# Patient Record
Sex: MALE | Race: WHITE | URBAN - METROPOLITAN AREA
[De-identification: names, ages, dates, MRNs, and addresses within clinical notes are randomized per-mention and may not be internally consistent; named-entity substitution may affect disease eponyms.]

---

## 2021-02-09 ENCOUNTER — TELEPHONE (OUTPATIENT)
Dept: PHYSICAL MEDICINE AND REHAB | Age: 61
End: 2021-02-09

## 2021-03-02 DIAGNOSIS — M54.2 CERVICALGIA: Primary | ICD-10-CM

## 2021-03-03 ENCOUNTER — OFFICE VISIT (OUTPATIENT)
Dept: PHYSICAL MEDICINE AND REHAB | Age: 61
End: 2021-03-03
Payer: MEDICARE

## 2021-03-03 VITALS — HEIGHT: 68 IN | WEIGHT: 205 LBS | BODY MASS INDEX: 31.07 KG/M2

## 2021-03-03 DIAGNOSIS — M54.12 CERVICAL RADICULOPATHY: ICD-10-CM

## 2021-03-03 DIAGNOSIS — G56.01 CARPAL TUNNEL SYNDROME OF RIGHT WRIST: Primary | ICD-10-CM

## 2021-03-03 PROCEDURE — 95912 NRV CNDJ TEST 11-12 STUDIES: CPT | Performed by: PHYSICAL MEDICINE & REHABILITATION

## 2021-03-03 PROCEDURE — G8417 CALC BMI ABV UP PARAM F/U: HCPCS | Performed by: PHYSICAL MEDICINE & REHABILITATION

## 2021-03-03 PROCEDURE — G8484 FLU IMMUNIZE NO ADMIN: HCPCS | Performed by: PHYSICAL MEDICINE & REHABILITATION

## 2021-03-03 PROCEDURE — G8428 CUR MEDS NOT DOCUMENT: HCPCS | Performed by: PHYSICAL MEDICINE & REHABILITATION

## 2021-03-03 PROCEDURE — 95886 MUSC TEST DONE W/N TEST COMP: CPT | Performed by: PHYSICAL MEDICINE & REHABILITATION

## 2021-03-03 PROCEDURE — 99202 OFFICE O/P NEW SF 15 MIN: CPT | Performed by: PHYSICAL MEDICINE & REHABILITATION

## 2021-03-03 NOTE — PROGRESS NOTES
[id number]  Electrodiagnostic Laboratory  *Accredited by the [address] [place] [address] with exemplary status  [address] [facility] [place]  Phone: [phone]  Fax: [phone]      Date of Examination: 03/03/21  Patient Name: Nika Amin  is a 62y.o. year old male who was seen due to complaints of   Chief Complaint   Patient presents with    Extremity Pain     right arm: pain in neck to fingers. pain 8/10 on and off. 3+mos.  Numbness     numbness in wrist to fingers. numbness comes and goes.  Extremity Weakness     no weakness     Physical Exam: MSK: There is no joint effusion, deformity, instability, swelling, erythema or warmth. AROM is full in the spine and extremities. +Spurling right, +tinel on right wrist. Neurologic:  No focal sensorimotor deficit. Reflexes 2+ and symmetric. Gait is normal.    Impression:     1. Carpal tunnel syndrome of right wrist    2. Cervical radiculopathy        Plan:   · EMG is indicated to evaluate the above diagnosis. Orders Placed This Encounter   Procedures    FL NEEDLE EMG EA EXTREMTY W/PARASPINL AREA COMPLETE    FL MOTOR &/SENS 11-12 NRV CNDJ PRECONF ELTRODE LIMB     · EMG was done today and showed right C6 radiculopathy, right carpal tunnel sydrome. The patient was educated about the diagnosis and the prognosis. · Consider cervical MRI. · Advised patient to follow up with referring provider. Thank you for allowing me to participate in the care of your patient.       Sincerely,     Venita Deal

## 2021-03-03 NOTE — PROGRESS NOTES
[address] [facility]  Electrodiagnostic Laboratory  *Accredited by the [address] [place] [address] with exemplary status  [address] [facility] [place]  Phone: [phone]  Fax: [phone]    Referring Provider: Read Gitelman, DO  Primary Care Physician: No primary care provider on file. Patient Name: Lisy Jacobs  Patient YOB: 1960  Gender: male  BMI: Body mass index is 31.17 kg/m². Height 5' 8\" (1.727 m), weight 205 lb (93 kg). 3/3/2021    Description of clinical problem:   Chief Complaint   Patient presents with    Extremity Pain     right arm: pain in neck to fingers. pain 8/10 on and off. 3+mos.  Numbness     numbness in wrist to fingers. numbness comes and goes.  Extremity Weakness     no weakness     Sensory NCS      Nerve / Sites Rec. Site Peak Lat PP Amp Segments Distance Velocity Temp. ms µV  cm m/s °C   R Median - Digit II (Antidromic)      Palm Dig II 1.93 22.5 Palm - Dig II 7 52 33.5      Wrist Dig II 3.65 22.6 Wrist - Dig II 14 49 32   R Ulnar - Digit V (Antidromic)      Wrist Dig V 2.60 5.0 Wrist - Dig V 14 69 33.3   R Radial - Anatomical snuff box (Forearm)      Forearm Wrist 2.71 17.9 Forearm - Wrist 10 48 32       Combined Sensory Index      Nerve / Sites Rec. Site Peak Lat NP Amp PP Amp Segments Dist. Peak Diff Temp. ms µV µV  cm ms °C   R Median - CSI      Median Thumb 3.85 6.5 5.8 Median - Radial 10 0.52 31.4      Radial Thumb 3.33 3.2 1.8 Median - Ulnar 14 -2.03 31.4      Median Ring 4.06 7.3 14.1 Median palm - Ulnar palm 8        Ulnar Ring 6.09 12.1 26.7          CSI     CSI  1.51*        Motor NCS      Nerve / Sites Muscle Onset Amplitude Segments Distance Velocity Temp.     ms mV  cm m/s °C   R Median - APB      Palm APB 1.41 11.8 Palm - APB   31.1      Wrist APB 4.22 7.2 Wrist - Palm 8 28* 31.5      Elbow APB 8.49 6.6 Elbow - Wrist 20 47* 31.3   R Ulnar - ADM      Wrist ADM 2.97 11.4 Wrist - ADM 8  31.9      B. Elbow ADM 6.61 9.1 B.Elbow - Wrist 18 49 32.4      A. Elbow ADM 8.75 9.1 A. Elbow - B. Elbow 10 47 32.6       F  Wave      Nerve Fmin % F    ms %   R Median - APB 30.99 100   R Ulnar - ADM 31.20 100       EMG      EMG Summary Table     Spontaneous MUAP Recruitment   Muscle Nerve Roots IA Fib PSW Fasc Amp Dur. PPP Pattern   R. Triceps brachii Radial C6-8 N 1+ 1+ None N N N Decreased   R. Biceps brachii Musculocutaneous C5-C6 N 3+ 3+ None N N N Discrete   R. Pronator teres Median C6-C7 N None None None N N N N   R. First dorsal interosseous Ulnar C8-T1 N None None None N N N N   R. Abductor pollicis brevis Median Y3-V9 N None None None N N N N   R. Cervical paraspinals (low)  - N None None None N N N N   R. Cervical paraspinals (mid)  - N 2+ 2+ None N N N N       Study Limitations:  none    Summary of Findings:   Nerve conduction studies:   · The following nerve conduction studies were abnormal:   · The combined sensory index was abnormal.   · The right median motor conduction velocity across the wrist was focally slow. · All other nerve conduction studies listed in the table above were normal in latency, amplitude and conduction velocity. Needle EMG:   · Needle EMG was performed using a concentric needle. · The following abnormalities were seen on needle EMG: positive sharp waves and fibrillation potentials with decreased motor unit recruitment was present in the right C6 myotome.  All other muscles tested, as listed in the table above demonstrated normal amplitude, duration, phases and recruitment and no active denervation signs were seen. Diagnostic Interpretation: This study was abnormal.     Electrodiagnosis: There is electrodiagnostic evidence of a median mononeuropathy.    · Location: right at the wrist.   · Nature: [  ] Axonal   [ X ] Demyelinating  [  ] Mixed axonal and demyelinating     [  ] Sensory [  ] Motor               [ X ] Mixed sensorimotor     [  ] with active denervation       [ X ] without active denervation  · Duration: Acute  · Severity: moderate  · Prognosis: Good. The prognosis for recovery of demyelinating lesions is good if the cause is alleviated. Electrodiagnosis: There is electrodiagnostic evidence of a right radiculopathy. · Location: right C6    · Nature: [ X ] Axonal   [  ] Demyelinating  [  ] Mixed axonal and demyelinating     [  ] Sensory [ X ] Motor               [  ] Mixed sensorimotor     [ X ] with active denervation       [  ] without active denervation  · Duration: Subacute  · Severity: moderate  Prognosis: Poor . The prognosis for recovery of axonal lesions is poor and dependant on collateral sprouting and reinnervation. Previous Study: yes, on left arm 15+ years ago. Follow up EMG is recommended if clinically warranted. Technologist: Marli Gibson  Physician:     Eyal Blanchard D.O., P.T. Board Certified Physical Medicine and Rehabilitation  Board Certified Electrodiagnostic Medicine      Nerve conduction studies and electromyography were performed according to our laboratory policies and procedures which can be provided upon request. All abnormal values are identified in the table. Laboratory normal values can also be provided upon request.       Cc: Radha Galeas, DO  No primary care provider on file.

## 2021-03-03 NOTE — PATIENT INSTRUCTIONS
Electrodiagnotic Laboratory  [facility] the [facility] with Ex[facility]  BERTO Scales D.O. [facility]  [address] [facility] [place]  Phone: [phone]  Fax: [phone]        Today you had an electrodiagnostic exam which included nerve conduction studies (NCS) and electromyography (EMG). This test evaluated the electrical activity of your nerves and muscles to help determine if you have a nerve or muscle disease. This test can help determine the location and type of a nerve or muscle problem. This will help your referring doctor diagnose your condition and determine the appropriate next step in your treatment plan. After your test:    1. There are no long lasting side effects of the test.     2. You may resume your normal activities without restrictions. 3.  Resume any medications that were stopped for the test.     4  If you have sore areas or bruising in your muscles where the needle was placed, apply a cold pack to the sore area for 15-20 minutes three to four times a day as needed for pain. The soreness should go away in about 1-2 days. 5. Your results were provided  Briefly at the end of your test and the final detailed report will be provided to your referring physician, and/or primary care physician and any other parties you requested within 1-2 days of the examination. You may wish to contact your referring provider after a few days to determine what they would like you to do next. 6.  Please call [phone] with any questions or concerns and if you develop increased body temperature/fever, swelling, tenderness, increased pain and/or drainage from the sites where the needle was placed. Thank you for choosing us for your health care needs.

## 2021-03-04 DIAGNOSIS — M54.2 CERVICALGIA: ICD-10-CM
